# Patient Record
Sex: FEMALE | Race: WHITE | Employment: FULL TIME | URBAN - METROPOLITAN AREA
[De-identification: names, ages, dates, MRNs, and addresses within clinical notes are randomized per-mention and may not be internally consistent; named-entity substitution may affect disease eponyms.]

---

## 2020-12-15 ENCOUNTER — TRANSFERRED RECORDS (OUTPATIENT)
Dept: HEALTH INFORMATION MANAGEMENT | Facility: CLINIC | Age: 25
End: 2020-12-15

## 2021-01-15 ENCOUNTER — OFFICE VISIT (OUTPATIENT)
Dept: OPHTHALMOLOGY | Facility: CLINIC | Age: 26
End: 2021-01-15
Attending: OPHTHALMOLOGY

## 2021-01-15 DIAGNOSIS — H53.2 DOUBLE VISION: ICD-10-CM

## 2021-01-15 DIAGNOSIS — H53.10 SUBJECTIVE VISUAL DISTURBANCE: ICD-10-CM

## 2021-01-15 DIAGNOSIS — H50.05 ESOTROPIA, ALTERNATING: Primary | ICD-10-CM

## 2021-01-15 PROCEDURE — 92060 SENSORIMOTOR EXAMINATION: CPT | Performed by: OPHTHALMOLOGY

## 2021-01-15 PROCEDURE — 99203 OFFICE O/P NEW LOW 30 MIN: CPT | Mod: GC | Performed by: OPHTHALMOLOGY

## 2021-01-15 PROCEDURE — G0463 HOSPITAL OUTPT CLINIC VISIT: HCPCS | Performed by: TECHNICIAN/TECHNOLOGIST

## 2021-01-15 ASSESSMENT — CONF VISUAL FIELD
OD_NORMAL: 1
METHOD: COUNTING FINGERS
OS_NORMAL: 1

## 2021-01-15 ASSESSMENT — REFRACTION_WEARINGRX
OD_AXIS: 080
OS_SPHERE: +2.75
OD_SPHERE: +1.25
OD_CYLINDER: +1.75
OS_CYLINDER: +2.00
OS_AXIS: 104
SPECS_TYPE: SVL

## 2021-01-15 ASSESSMENT — VISUAL ACUITY
OD_CC+: -2
METHOD: SNELLEN - LINEAR
OS_CC: 20/200
CORRECTION_TYPE: GLASSES
OD_CC: 20/20
OS_CC+: -

## 2021-01-15 ASSESSMENT — CUP TO DISC RATIO
OD_RATIO: 0.1
OS_RATIO: 0.1

## 2021-01-15 ASSESSMENT — SLIT LAMP EXAM - LIDS
COMMENTS: NORMAL
COMMENTS: NORMAL

## 2021-01-15 ASSESSMENT — TONOMETRY
OS_IOP_MMHG: 15
IOP_METHOD: ICARE
OD_IOP_MMHG: 13

## 2021-01-15 NOTE — PROGRESS NOTES
"   1.  Congenital strabismus with a relatively small angle esotropia today on Krimsky testing.  Due to an abnormal angle kappa the patient has a large angle exotropia on alternate cover testing.  She reports intermittent periods of more prominent turning in of the eyes when fatigued however her angle of misalignment today is quite small ranging 4-8 prism diopters by Krimsky.  For this reason I recommended observation at this time.  If her eyes drift further in the future then I be happy to reevaluate her for reconstructive strabismus surgery.   Also if her binocular diplopia were to become more common instead of just rarely then we might reconsider strabismus surgery.   I recommend that the patient continue to follow-up with Dr. Gann for her routine eye care moving forward.    2.  Left eye moderate amblyopia    Isela Moreno is a pleasant 25 year old White female who presents to neuro-ophthalmology clinic today as referral from Dr. Gann for diplopia    Patient reports occasional double vision (a few times a week). She notices it more with near vision. The two images are side by side; the right image is solid and the left one is almost a ghost image.     She thinks she had a \"lazy eye\" on the left immediately after she is born. She grew up with a patch on the right eye. She started wearing glasses when she was 3-4 years of age. She thinks her left eye tends to turn in when she is tired or not wearing glasses. She never had any strabismus surgeries. No she does not know any family member with eye. She would like to know ways to improve the control of the eye alignment or minimize the double vision.      She sees floaters in her vision for 4 years. 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   She feels there is some pressure above her eyes.  She does not think she was premature or have any problems at birth.     POH:   ?Pigment dispersion     PMH:  None     FH:   Mother has " cataract    Meds:  Systane eyedrops BID RPN, multivitamin    Visual acuity is 20/20, 20/200. Intraocular pressure is 13/15. Pupils are normal without APD. Confrontational visual fields full. Motility full. Slit lamp exam unremarkable. Dilated fundus exam unremarkable.  Sensorimotor examination revealed a relatively concomitant 4-8 prism diopter esotropia on Krimsky testing.  On alternate cover testing there was a much larger 25-50 prism diopter exotropia due to abnormal negative angle kappa.    It is my impression that patient has strabismic amblyopia of the left eye. She has a large negative angle kappa with significant difference between her alternate cover testing and her krimsky strabismus measurements.  She does not at this time have disfiguring strabismus with only an apparent small angle esotropia by corneal light reflex measurements.  Her intermittent binocular diplopia is rare and occurs when she loses facultative suppression when fatigued.  This would likely still occur occasionally if we were to proceed to strabismus surgery.    For these aforementioned reasons I don't believe strabismus surgery is indicated at this time.  I'd always be happy to re-evaluate this decision in the future should her strabismus angle grow or should she develop more constant binocular diplopia.     35 minutes were spent on the date of the encounter by me doing chart review, history and exam, documentation, and further activities as noted above    Complete documentation of historical and exam elements from today's encounter can be found in the full encounter summary report (not reduplicated in this progress note).  I personally obtained the chief complaint(s) and history of present illness.  I confirmed and edited as necessary the review of systems, past medical/surgical history, family history, social history, and examination findings as documented by others; and I examined the patient myself.  I personally reviewed the  relevant tests, images, and reports as documented above.  I formulated and edited as necessary the assessment and plan and discussed the findings and management plan with the patient and family.  I personally reviewed the ophthalmic test(s) associated with this encounter, agree with the interpretation(s) as documented by the resident/fellow, and have edited the corresponding report(s) as necessary.     MD Leonidas Arzate MD  Ophthalmology PGY-3

## 2021-01-15 NOTE — LETTER
"2021    RE: Isela Moreno  : 1995  MRN: [id number]    Dear Dr. Gann,    Thank you for referring your patient, Isela Moreno, to my neuro-ophthalmology clinic recently.  After a thorough neuro-ophthalmic history and examination, I came to the following conclusions:     1.  Congenital strabismus with a relatively small angle esotropia today on Krimsky testing.  Due to an abnormal angle kappa the patient has a large angle exotropia on alternate cover testing.  She reports intermittent periods of more prominent turning in of the eyes when fatigued however her angle of misalignment today is quite small ranging 4-8 prism diopters by Krimsky.  For this reason I recommended observation at this time.  If her eyes drift further in the future then I be happy to reevaluate her for reconstructive strabismus surgery.   Also if her binocular diplopia were to become more common instead of just rarely then we might reconsider strabismus surgery.   I recommend that the patient continue to follow-up with Dr. Gann for her routine eye care moving forward.    2.  Left eye moderate amblyopia    Isela Moreno is a pleasant 25 year old White female who presents to neuro-ophthalmology clinic today as referral from Dr. Gann for diplopia    Patient reports occasional double vision (a few times a week). She notices it more with near vision. The two images are side by side; the right image is solid and the left one is almost a ghost image.     She thinks she had a \"lazy eye\" on the left immediately after she is born. She grew up with a patch on the right eye. She started wearing glasses when she was 3-4 years of age. She thinks her left eye tends to turn in when she is tired or not wearing glasses. She never had any strabismus surgeries. No she does not know any family member with eye. She would like to know ways to improve the control of the eye alignment or minimize the double vision.      She sees " floaters in her vision for 4 years. 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   She feels there is some pressure above her eyes.  She does not think she was premature or have any problems at birth.     POH:   ?Pigment dispersion     PMH:  None     FH:   Mother has cataract    Meds:  Systane eyedrops BID RPN, multivitamin    Visual acuity is 20/20, 20/200. Intraocular pressure is 13/15. Pupils are normal without APD. Confrontational visual fields full. Motility full. Slit lamp exam unremarkable. Dilated fundus exam unremarkable.  Sensorimotor examination revealed a relatively concomitant 4-8 prism diopter esotropia on Krimsky testing.  On alternate cover testing there was a much larger 25-50 prism diopter exotropia due to abnormal negative angle kappa.    It is my impression that patient has strabismic amblyopia of the left eye. She has a large negative angle kappa with significant difference between her alternate cover testing and her krimsky strabismus measurements.  She does not at this time have disfiguring strabismus with only an apparent small angle esotropia by corneal light reflex measurements.  Her intermittent binocular diplopia is rare and occurs when she loses facultative suppression when fatigued.  This would likely still occur occasionally if we were to proceed to strabismus surgery.    For these aforementioned reasons I don't believe strabismus surgery is indicated at this time.  I'd always be happy to re-evaluate this decision in the future should her strabismus angle grow or should she develop more constant binocular diplopia.    Again, thank you for trusting me with the care of your patient.  For further exam details, please feel free to contact our office for additional records.  If you wish to contact me regarding this patient please email me at [email or web address] or give my clinic a call to arrange a phone conversation.    Sincerely,    Gurdeep Newsome MD  Associate  Professor, Neuro-Ophthalmology and Adult Strabismus Surgery  [facility] [employer] [facility] in Neuro-Ophthalmology  Department of Ophthalmology and Visual Neurosciences  [facility]    DX: Congenital strabismus, negative angle kappa

## 2021-06-20 ENCOUNTER — HEALTH MAINTENANCE LETTER (OUTPATIENT)
Age: 26
End: 2021-06-20

## 2021-10-11 ENCOUNTER — HEALTH MAINTENANCE LETTER (OUTPATIENT)
Age: 26
End: 2021-10-11

## 2022-07-17 ENCOUNTER — HEALTH MAINTENANCE LETTER (OUTPATIENT)
Age: 27
End: 2022-07-17

## 2022-09-24 ENCOUNTER — HEALTH MAINTENANCE LETTER (OUTPATIENT)
Age: 27
End: 2022-09-24

## 2023-08-05 ENCOUNTER — HEALTH MAINTENANCE LETTER (OUTPATIENT)
Age: 28
End: 2023-08-05